# Patient Record
Sex: FEMALE | Race: AMERICAN INDIAN OR ALASKA NATIVE
[De-identification: names, ages, dates, MRNs, and addresses within clinical notes are randomized per-mention and may not be internally consistent; named-entity substitution may affect disease eponyms.]

---

## 2019-08-20 ENCOUNTER — HOSPITAL ENCOUNTER (EMERGENCY)
Dept: HOSPITAL 5 - ED | Age: 49
Discharge: HOME | End: 2019-08-20
Payer: COMMERCIAL

## 2019-08-20 VITALS — DIASTOLIC BLOOD PRESSURE: 101 MMHG | SYSTOLIC BLOOD PRESSURE: 185 MMHG
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
## 2019-08-20 DIAGNOSIS — S93.402A: Primary | ICD-10-CM
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
## 2019-08-20 PROCEDURE — 73600 X-RAY EXAM OF ANKLE: CPT

## 2019-08-20 PROCEDURE — 99283 EMERGENCY DEPT VISIT LOW MDM: CPT

## 2019-08-20 PROCEDURE — 96372 THER/PROPH/DIAG INJ SC/IM: CPT

## 2019-08-20 NOTE — EMERGENCY DEPARTMENT REPORT
ED General Adult HPI





- General


Chief complaint: Extremity Injury, Lower


Stated complaint: LT ANKLE INJURY


Time Seen by Provider: 08/20/19 10:30


Source: patient


Mode of arrival: Ambulatory


Limitations: No Limitations





- History of Present Illness


Initial comments: 





Patient is a 49-year-old female presents to the complaining of left ankle pain 

that started yesterday.  Patient states she was trying left IN THE HERS WHEN SHE

ACCIDENTALLY TWISTED HER ANKLE.  PATIENT STATES THAT SHE IS EVIDENT PAIN UPON 

WALKING APPLIED PRESSURE TO THE ANKLE.  SHE HAD MILD SWELLING TO THE ANKLE BUT 

STATES THAT HER LEGS ARE USUALLY SWOLLEN SHE CAN'T TELL THE DIFFERENCE.  SHE 

DENIES DEFORMITY, LOSS OF SENSATION OR INABILITY TO WALK.  


-: Sudden, days(s) (1)


Location: left, lower extremity (ankle)


Severity scale (0 -10): 5


Quality: aching


Improves with: immobilization


Worsens with: movement





- Related Data


                                  Previous Rx's











 Medication  Instructions  Recorded  Last Taken  Type


 


Cyclobenzaprine [Flexeril] 10 mg PO QHS #15 tablet 08/20/19 Unknown Rx


 


Diclofenac Dr [Voltaren Dr] 75 mg PO BID #20 tablet 08/20/19 Unknown Rx











                                    Allergies











Allergy/AdvReac Type Severity Reaction Status Date / Time


 


No Known Allergies Allergy   Unverified 08/20/19 09:14














ED Review of Systems


ROS: 


Stated complaint: LT ANKLE INJURY


Other details as noted in HPI





Comment: All other systems reviewed and negative


Constitutional: denies: chills, fever


Eyes: denies: eye pain, eye discharge, vision change


ENT: denies: ear pain, throat pain


Respiratory: denies: cough, shortness of breath, wheezing


Cardiovascular: denies: chest pain, palpitations


Endocrine: no symptoms reported


Gastrointestinal: denies: abdominal pain, nausea, diarrhea


Genitourinary: denies: urgency, dysuria, discharge


Musculoskeletal: denies: back pain, joint swelling, arthralgia


Skin: denies: rash, lesions


Neurological: denies: headache, weakness, paresthesias


Psychiatric: denies: anxiety, depression


Hematological/Lymphatic: denies: easy bleeding, easy bruising





ED Past Medical Hx





- Past Medical History


Previous Medical History?: Yes


Hx Hypertension: Yes





- Surgical History


Past Surgical History?: Yes


Hx Cholecystectomy: Yes





- Social History


Smoking Status: Never Smoker


Substance Use Type: None





- Medications


Home Medications: 


                                Home Medications











 Medication  Instructions  Recorded  Confirmed  Last Taken  Type


 


Cyclobenzaprine [Flexeril] 10 mg PO QHS #15 tablet 08/20/19  Unknown Rx


 


Diclofenac Dr [Voltaren Dr] 75 mg PO BID #20 tablet 08/20/19  Unknown Rx














ED Physical Exam





- General


Limitations: No Limitations


General appearance: alert, in no apparent distress





- Head


Head exam: Present: atraumatic, normocephalic





- Eye


Eye exam: Present: normal appearance





- ENT


ENT exam: Present: mucous membranes moist





- Neck


Neck exam: Present: normal inspection, full ROM.  Absent: tenderness





- Respiratory


Respiratory exam: Present: normal lung sounds bilaterally.  Absent: respiratory 

distress





- Cardiovascular


Cardiovascular Exam: Present: regular rate, normal rhythm.  Absent: systolic 

murmur, diastolic murmur, rubs, gallop





- GI/Abdominal


GI/Abdominal exam: Present: soft, normal bowel sounds





- Extremities Exam


Extremities exam: Present: normal inspection, full ROM, tenderness (to the 

lateral aspect of the ankle left), normal capillary refill, joint swelling 

(around the left ankle, non pitting).  Absent: calf tenderness





- Back Exam


Back exam: Present: normal inspection.  Absent: full ROM





- Neurological Exam


Neurological exam: Present: alert, oriented X3





- Psychiatric


Psychiatric exam: Present: normal affect, normal mood





- Skin


Skin exam: Present: warm, dry, intact, normal color.  Absent: rash





ED Course


                                   Vital Signs











  08/20/19 08/20/19





  09:22 11:17


 


Temperature 98.4 F 


 


Pulse Rate 86 


 


Respiratory 20 17





Rate  


 


Blood Pressure 185/101 


 


O2 Sat by Pulse 99 





Oximetry  














ED Medical Decision Making





- Radiology Data


Radiology results: report reviewed, image reviewed





- Medical Decision Making





49-year-old female presents with sprain left ankle.


Marked swelling noted on x-ray is consistent with patient's history of swelling 

to the lower ankles.


There is no acute fracture or dislocation noted.


Discussed the patient apply pressure and ice and elevate her left ankle.


Vital signs are normal patient is in no acute distress.


Patient has crutches and is able to ambulate with crutches.


Discussed follow-up with primary care physician.


Critical care attestation.: 


If time is entered above; I have spent that time in minutes in the direct care 

of this critically ill patient, excluding procedure time.








ED Disposition


Clinical Impression: 


 Left ankle sprain





Disposition: DC-01 TO HOME OR SELFCARE


Is pt being admited?: No


Does the pt Need Aspirin: No


Condition: Stable


Instructions:  Ankle Exercises (GEN), Ankle Sprain (ED)


Additional Instructions: 


Make sure to follow up with the primary care physician as discussed.


Buy some compression socks Werner wear on feet throughout the day


Take all your medications as you've been prescribed.


If you have any worsening symptoms or develop new symptoms please return to ED 

immediately.


Prescriptions: 


Cyclobenzaprine [Flexeril] 10 mg PO QHS #15 tablet


Diclofenac Dr [Voltaren ] 75 mg PO BID #20 tablet


Referrals: 


[facility] - [facility] Days


KO WALL MD [Staff Physician] - 3-5 Days


Forms:  Work/School Release Form(ED)


Time of Disposition: 12:25